# Patient Record
Sex: MALE | Race: OTHER | NOT HISPANIC OR LATINO | URBAN - METROPOLITAN AREA
[De-identification: names, ages, dates, MRNs, and addresses within clinical notes are randomized per-mention and may not be internally consistent; named-entity substitution may affect disease eponyms.]

---
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
## 2023-06-12 ENCOUNTER — OFFICE (OUTPATIENT)
Dept: URBAN - METROPOLITAN AREA CLINIC 27 | Facility: CLINIC | Age: 18
Setting detail: OPHTHALMOLOGY
End: 2023-06-12
Payer: COMMERCIAL

## 2023-06-12 DIAGNOSIS — H35.411: ICD-10-CM

## 2023-06-12 DIAGNOSIS — H52.13: ICD-10-CM

## 2023-06-12 PROCEDURE — 92014 COMPRE OPH EXAM EST PT 1/>: CPT | Performed by: OPHTHALMOLOGY

## 2023-06-12 PROCEDURE — 92015 DETERMINE REFRACTIVE STATE: CPT | Performed by: OPHTHALMOLOGY

## 2023-06-12 PROCEDURE — 92201 OPSCPY EXTND RTA DRAW UNI/BI: CPT | Performed by: OPHTHALMOLOGY

## 2023-06-12 ASSESSMENT — REFRACTION_MANIFEST
OS_AXIS: 075
OD_SPHERE: -2.50
OD_CYLINDER: +0.50
OD_SPHERE: -2.50
OS_CYLINDER: SPH
OD_AXIS: 080
OD_SPHERE: -1.75
OD_VA1: 20/20
OD_AXIS: 085
OS_VA1: 20/20
OS_VA1: 20/20
OS_SPHERE: -2.50
OD_CYLINDER: SPH
OS_AXIS: 075
OS_SPHERE: -2.25
OD_VA1: 20/20-1
OD_CYLINDER: +0.50
OS_CYLINDER: +0.50
OS_CYLINDER: +0.50
OS_VA1: 20/20
OS_SPHERE: -2.00
OD_VA1: 20/20

## 2023-06-12 ASSESSMENT — REFRACTION_AUTOREFRACTION
OD_SPHERE: -1.75
OD_AXIS: 074
OS_CYLINDER: +0.25
OD_SPHERE: -2.50
OS_AXIS: 5078
OS_SPHERE: -2.00
OD_CYLINDER: +0.25
OS_SPHERE: -2.50
OS_CYLINDER: +0.77
OD_AXIS: 092
OS_AXIS: 072
OD_CYLINDER: +0.75

## 2023-06-12 ASSESSMENT — REFRACTION_CURRENTRX
OS_VPRISM_DIRECTION: SV
OS_SPHERE: -2.00
OD_SPHERE: -1.75
OD_OVR_VA: 20/
OD_VPRISM_DIRECTION: SV
OS_OVR_VA: 20/

## 2023-06-12 ASSESSMENT — SPHEQUIV_DERIVED
OD_SPHEQUIV: -1.625
OD_SPHEQUIV: -2.125
OS_SPHEQUIV: -1.875
OS_SPHEQUIV: -2
OS_SPHEQUIV: -2.25
OS_SPHEQUIV: -2.12
OD_SPHEQUIV: -2.25
OD_SPHEQUIV: -2.25

## 2023-06-12 ASSESSMENT — AXIALLENGTH_DERIVED
OS_AL: 24.9984
OS_AL: 25.1
OD_AL: 24.9922
OD_AL: 25.2685
OD_AL: 25.2685
OS_AL: 25.0532
OD_AL: 25.2127
OS_AL: 25.1636

## 2023-06-12 ASSESSMENT — KERATOMETRY
OD_K2POWER_DIOPTERS: 42.00
OS_K2POWER_DIOPTERS: 42.25
METHOD_AUTO_MANUAL: AUTO
OD_AXISANGLE_DEGREES: 093
OS_AXISANGLE_DEGREES: 080
OD_K1POWER_DIOPTERS: 41.25
OS_K1POWER_DIOPTERS: 41.50

## 2023-06-12 ASSESSMENT — CONFRONTATIONAL VISUAL FIELD TEST (CVF)
OS_FINDINGS: FULL
OD_FINDINGS: FULL

## 2023-06-12 ASSESSMENT — VISUAL ACUITY
OS_BCVA: 20/20
OD_BCVA: 20/20

## 2023-06-12 ASSESSMENT — TONOMETRY
OS_IOP_MMHG: 17
OD_IOP_MMHG: 13

## 2023-10-11 NOTE — ED PEDIATRIC NURSE NOTE - PAIN RATING/NUMBER SCALE (0-10): ACTIVITY
normal appearance , without tenderness upon palpation , no deformities , trachea midline , Thyroid normal size , no masses , thyroid nontender 4

## 2024-06-14 ENCOUNTER — OFFICE (OUTPATIENT)
Dept: URBAN - METROPOLITAN AREA CLINIC 35 | Facility: CLINIC | Age: 19
Setting detail: OPHTHALMOLOGY
End: 2024-06-14
Payer: COMMERCIAL

## 2024-06-14 DIAGNOSIS — H00.025: ICD-10-CM

## 2024-06-14 DIAGNOSIS — H00.022: ICD-10-CM

## 2024-06-14 PROCEDURE — 99213 OFFICE O/P EST LOW 20 MIN: CPT | Performed by: OPHTHALMOLOGY

## 2024-06-14 ASSESSMENT — CONFRONTATIONAL VISUAL FIELD TEST (CVF)
OS_FINDINGS: FULL
OD_FINDINGS: FULL

## 2024-06-14 ASSESSMENT — LID EXAM ASSESSMENTS
OS_MEIBOMITIS: LLL
OS_COMMENTS: HYPEREMIA, SHORTENED GLANDS
OD_COMMENTS: HYPEREMIA, SHORTENED GLANDS
OD_MEIBOMITIS: RLL

## 2024-11-16 ENCOUNTER — DOCTOR'S OFFICE (OUTPATIENT)
Facility: LOCATION | Age: 19
Setting detail: OPHTHALMOLOGY
End: 2024-11-16
Payer: COMMERCIAL

## 2024-11-16 DIAGNOSIS — H52.13: ICD-10-CM

## 2024-11-16 PROCEDURE — 92015 DETERMINE REFRACTIVE STATE: CPT | Performed by: OPHTHALMOLOGY

## 2024-11-16 ASSESSMENT — VISUAL ACUITY
OD_BCVA: 20/20
OS_BCVA: 20/20

## 2024-11-16 ASSESSMENT — REFRACTION_MANIFEST
OD_CYLINDER: +0.50
OD_VA1: 20/20
OS_AXIS: 075
OD_SPHERE: -2.00
OD_CYLINDER: -0.50
OS_CYLINDER: +0.50
OS_AXIS: 151
OD_VA1: 20/20
OS_AXIS: 075
OD_SPHERE: -2.00
OS_CYLINDER: -0.75
OS_SPHERE: -2.00
OD_AXIS: 174
OS_VA1: 20/20
OS_SPHERE: -1.75
OD_VA1: 20/15
OD_CYLINDER: SPH
OD_AXIS: 080
OD_VA1: 20/20
OD_AXIS: 174
OD_CYLINDER: -0.50
OS_VA1: 20/20
OD_VA1: 20/20-1
OS_VA1: 20/10
OS_CYLINDER: +0.50
OS_SPHERE: -2.50
OS_CYLINDER: SPH
OD_CYLINDER: +0.50
OS_CYLINDER: -0.50
OD_SPHERE: -1.75
OD_AXIS: 085
OD_SPHERE: -2.50
OS_VA1: 20/20
OS_AXIS: 150
OS_VA1: 20/20
OS_SPHERE: -2.25
OS_SPHERE: -1.75
OD_SPHERE: -2.50

## 2024-11-16 ASSESSMENT — KERATOMETRY
OD_K2POWER_DIOPTERS: 42.00
OS_K2POWER_DIOPTERS: 42.00
OS_AXISANGLE_DEGREES: 065
OD_AXISANGLE_DEGREES: 070
OD_K1POWER_DIOPTERS: 41.50
OS_K1POWER_DIOPTERS: 41.25
METHOD_AUTO_MANUAL: AUTO

## 2024-11-16 ASSESSMENT — REFRACTION_CURRENTRX
OS_OVR_VA: 20/
OS_AXIS: 174
OD_AXIS: 174
OS_SPHERE: -2.00
OD_CYLINDER: +0.50
OD_AXIS: 087
OS_CYLINDER: +0.50
OD_OVR_VA: 20/
OS_SPHERE: -2.50
OD_VPRISM_DIRECTION: SV
OD_SPHERE: -2.50
OD_SPHERE: -2.00
OS_AXIS: 077
OD_OVR_VA: 20/
OS_CYLINDER: -0.50
OD_CYLINDER: -0.50
OS_OVR_VA: 20/
OS_VPRISM_DIRECTION: SV

## 2024-11-16 ASSESSMENT — REFRACTION_AUTOREFRACTION
OD_CYLINDER: +0.25
OS_AXIS: 072
OD_SPHERE: -2.00
OD_AXIS: 074
OD_CYLINDER: -0.50
OS_CYLINDER: +0.25
OD_SPHERE: -1.75
OS_AXIS: 151
OD_AXIS: 174
OS_SPHERE: -2.00
OS_CYLINDER: -0.50
OS_SPHERE: -1.75

## 2024-11-16 ASSESSMENT — LID EXAM ASSESSMENTS
OD_COMMENTS: HYPEREMIA, SHORTENED GLANDS
OS_COMMENTS: HYPEREMIA, SHORTENED GLANDS
OD_MEIBOMITIS: RLL
OS_MEIBOMITIS: LLL

## 2024-11-16 ASSESSMENT — CONFRONTATIONAL VISUAL FIELD TEST (CVF)
OS_FINDINGS: FULL
OD_FINDINGS: FULL

## 2024-12-11 NOTE — ED PROVIDER NOTE - PSH
Patient arrives to the ED with a cc of a derm problem/possible bug bite to her right forearm. Patient states she noticed the redness/swelling/itchiness 2 days ago.   
No significant past surgical history